# Patient Record
Sex: FEMALE | Race: BLACK OR AFRICAN AMERICAN | NOT HISPANIC OR LATINO | URBAN - METROPOLITAN AREA
[De-identification: names, ages, dates, MRNs, and addresses within clinical notes are randomized per-mention and may not be internally consistent; named-entity substitution may affect disease eponyms.]

---

## 2020-07-17 ENCOUNTER — OFFICE VISIT (OUTPATIENT)
Dept: URBAN - METROPOLITAN AREA CLINIC 84 | Facility: CLINIC | Age: 25
End: 2020-07-17
Payer: COMMERCIAL

## 2020-07-17 DIAGNOSIS — R79.89 ELEVATED LFTS: ICD-10-CM

## 2020-07-17 PROCEDURE — G8417 CALC BMI ABV UP PARAM F/U: HCPCS | Performed by: INTERNAL MEDICINE

## 2020-07-17 PROCEDURE — G9903 PT SCRN TBCO ID AS NON USER: HCPCS | Performed by: INTERNAL MEDICINE

## 2020-07-17 PROCEDURE — G8427 DOCREV CUR MEDS BY ELIG CLIN: HCPCS | Performed by: INTERNAL MEDICINE

## 2020-07-17 PROCEDURE — 99204 OFFICE O/P NEW MOD 45 MIN: CPT | Performed by: INTERNAL MEDICINE

## 2020-07-17 NOTE — HPI-OTHER HISTORIES
24 year old woman referred by Ob for elevated LFT's and abnormal Hep serologies.  She is 27 weeks pregnant.  She is feeling well.  Overall she feels wlel.  She vernon anroexia or weight loss.  She denies regular abdominal pain.  She denies UGI symptoms.  She denies LGI symptoms.  She denies LGI bleed or melena.  She denies EtOH.  She denies regular APAP or NSAID's.  She went to the ER about 3 weeks ago with a RLQ pain.  She was found to have elevated LFT's.  Per the patient she had a RUQ US and she was told everything was normal.

## 2020-07-18 LAB
ALBUMIN: 4.1
ALKALINE PHOSPHATASE: 155
ALT (SGPT): 511
AST (SGOT): 333
BILIRUBIN, DIRECT: 0.27
BILIRUBIN, TOTAL: 0.5
HEP A AB, IGM: NEGATIVE
HEP B SURFACE AB, QUAL: NON REACTIVE
PROTEIN, TOTAL: 7

## 2020-07-20 ENCOUNTER — TELEPHONE ENCOUNTER (OUTPATIENT)
Dept: URBAN - METROPOLITAN AREA CLINIC 92 | Facility: CLINIC | Age: 25
End: 2020-07-20

## 2020-08-13 LAB
ACTIN (SMOOTH MUSCLE) ANTIBODY: 21
ALBUMIN: 3.9
ALKALINE PHOSPHATASE: 188
ALPHA-1-ANTITRYPSIN, SERUM: 247
ALT (SGPT): 392
ANTINUCLEAR ANTIBODIES, IFA: NEGATIVE
AST (SGOT): 224
BILIRUBIN, DIRECT: 0.26
BILIRUBIN, TOTAL: 0.4
FERRITIN, SERUM: 56
GGT: 60
HBSAG SCREEN: NEGATIVE
IRON BIND.CAP.(TIBC): 519
IRON SATURATION: 12
IRON: 62
Lab: (no result)
MITOCHONDRIAL (M2) ANTIBODY: <20
PROTEIN, TOTAL: 6.6
UIBC: 457

## 2020-08-20 ENCOUNTER — WEB ENCOUNTER (OUTPATIENT)
Dept: URBAN - METROPOLITAN AREA CLINIC 84 | Facility: CLINIC | Age: 25
End: 2020-08-20

## 2020-08-20 ENCOUNTER — OFFICE VISIT (OUTPATIENT)
Dept: URBAN - METROPOLITAN AREA CLINIC 84 | Facility: CLINIC | Age: 25
End: 2020-08-20
Payer: COMMERCIAL

## 2020-08-20 DIAGNOSIS — R79.89 ELEVATED LFTS: ICD-10-CM

## 2020-08-20 DIAGNOSIS — Z34.90 PREGNANCY: ICD-10-CM

## 2020-08-20 PROCEDURE — G9903 PT SCRN TBCO ID AS NON USER: HCPCS | Performed by: INTERNAL MEDICINE

## 2020-08-20 PROCEDURE — G8417 CALC BMI ABV UP PARAM F/U: HCPCS | Performed by: INTERNAL MEDICINE

## 2020-08-20 PROCEDURE — 99213 OFFICE O/P EST LOW 20 MIN: CPT | Performed by: INTERNAL MEDICINE

## 2020-08-20 PROCEDURE — 93976 VASCULAR STUDY: CPT | Performed by: INTERNAL MEDICINE

## 2020-08-20 PROCEDURE — G8427 DOCREV CUR MEDS BY ELIG CLIN: HCPCS | Performed by: INTERNAL MEDICINE

## 2020-08-20 NOTE — HPI-TODAY'S VISIT:
Initial LFT's were more elevated.  Full serologic w/u was negtive.  Repeat LFT's from 8/11 show improvement but they are still elevated.  Overall she feels well.  She has decreased appetite.  She is gaining appropriate weight.  She denies UGI symptoms.  She denies abdominal pain.  She denies LGI symptoms.  SHe denies LGI bleed or melena.  She denies symptoms of chronic liver disease.  She is now 32 weeks pregnant.  Upon further questioning she had some "itchiness" of her left arm

## 2020-08-21 ENCOUNTER — TELEPHONE ENCOUNTER (OUTPATIENT)
Dept: URBAN - METROPOLITAN AREA CLINIC 92 | Facility: CLINIC | Age: 25
End: 2020-08-21

## 2020-08-25 ENCOUNTER — TELEPHONE ENCOUNTER (OUTPATIENT)
Dept: URBAN - METROPOLITAN AREA CLINIC 92 | Facility: CLINIC | Age: 25
End: 2020-08-25

## 2020-09-04 ENCOUNTER — TELEPHONE ENCOUNTER (OUTPATIENT)
Dept: URBAN - METROPOLITAN AREA CLINIC 84 | Facility: CLINIC | Age: 25
End: 2020-09-04

## 2020-09-08 LAB
ALBUMIN: 4.3
ALKALINE PHOSPHATASE: 236
ALT (SGPT): 385
AST (SGOT): 285
BILE ACIDS: 30.4
BILIRUBIN, DIRECT: 0.3
BILIRUBIN, TOTAL: 0.4
PROTEIN, TOTAL: 7.2

## 2020-09-10 ENCOUNTER — TELEPHONE ENCOUNTER (OUTPATIENT)
Dept: URBAN - METROPOLITAN AREA CLINIC 92 | Facility: CLINIC | Age: 25
End: 2020-09-10

## 2020-09-10 RX ORDER — URSODIOL 300 MG/1
1 TABLET WITH FOOD CAPSULE ORAL THREE TIMES A DAY
Qty: 90 | Refills: 2 | OUTPATIENT
Start: 2020-09-10

## 2020-09-17 ENCOUNTER — OFFICE VISIT (OUTPATIENT)
Dept: URBAN - METROPOLITAN AREA CLINIC 84 | Facility: CLINIC | Age: 25
End: 2020-09-17

## 2020-09-17 ENCOUNTER — LAB OUTSIDE AN ENCOUNTER (OUTPATIENT)
Dept: URBAN - METROPOLITAN AREA CLINIC 84 | Facility: CLINIC | Age: 25
End: 2020-09-17

## 2020-09-20 LAB
ALBUMIN: 4.2
ALKALINE PHOSPHATASE: 236
ALT (SGPT): 203
AST (SGOT): 137
BILE ACIDS: 13
BILIRUBIN, DIRECT: 0.26
BILIRUBIN, TOTAL: 0.4
PROTEIN, TOTAL: 7.1

## 2020-09-21 ENCOUNTER — TELEPHONE ENCOUNTER (OUTPATIENT)
Dept: URBAN - METROPOLITAN AREA CLINIC 92 | Facility: CLINIC | Age: 25
End: 2020-09-21

## 2020-10-12 ENCOUNTER — LAB OUTSIDE AN ENCOUNTER (OUTPATIENT)
Dept: URBAN - METROPOLITAN AREA CLINIC 84 | Facility: CLINIC | Age: 25
End: 2020-10-12

## 2020-10-23 LAB
ALBUMIN: 4.5
ALKALINE PHOSPHATASE: 133
ALT (SGPT): 11
AST (SGOT): 12
BILIRUBIN, DIRECT: 0.14
BILIRUBIN, TOTAL: 0.3
PROTEIN, TOTAL: 7.2

## 2020-10-26 ENCOUNTER — TELEPHONE ENCOUNTER (OUTPATIENT)
Dept: URBAN - METROPOLITAN AREA CLINIC 92 | Facility: CLINIC | Age: 25
End: 2020-10-26

## 2022-07-28 ENCOUNTER — DASHBOARD ENCOUNTERS (OUTPATIENT)
Age: 27
End: 2022-07-28

## 2022-08-11 ENCOUNTER — OFFICE VISIT (OUTPATIENT)
Dept: URBAN - METROPOLITAN AREA CLINIC 84 | Facility: CLINIC | Age: 27
End: 2022-08-11

## 2022-08-11 RX ORDER — URSODIOL 300 MG/1
1 TABLET WITH FOOD CAPSULE ORAL THREE TIMES A DAY
Qty: 90 | Refills: 2 | Status: ACTIVE | COMMUNITY
Start: 2020-09-10

## 2022-10-14 ENCOUNTER — OFFICE VISIT (OUTPATIENT)
Dept: URBAN - METROPOLITAN AREA CLINIC 84 | Facility: CLINIC | Age: 27
End: 2022-10-14
